# Patient Record
Sex: MALE | Race: WHITE | Employment: OTHER | URBAN - METROPOLITAN AREA
[De-identification: names, ages, dates, MRNs, and addresses within clinical notes are randomized per-mention and may not be internally consistent; named-entity substitution may affect disease eponyms.]

---

## 2017-05-16 PROBLEM — J18.9 CAP (COMMUNITY ACQUIRED PNEUMONIA): Status: ACTIVE | Noted: 2017-05-16

## 2017-10-26 ENCOUNTER — HOSPITAL ENCOUNTER (OUTPATIENT)
Dept: ENDOSCOPY | Age: 52
Discharge: HOME | End: 2017-10-26
Attending: INTERNAL MEDICINE | Admitting: INTERNAL MEDICINE

## 2017-11-30 ENCOUNTER — HOSPITAL ENCOUNTER (OUTPATIENT)
Dept: ENDOSCOPY | Age: 52
Discharge: HOME | End: 2017-11-30
Attending: INTERNAL MEDICINE | Admitting: INTERNAL MEDICINE

## 2017-11-30 VITALS
OXYGEN SATURATION: 98 % | DIASTOLIC BLOOD PRESSURE: 85 MMHG | WEIGHT: 182 LBS | BODY MASS INDEX: 24.65 KG/M2 | SYSTOLIC BLOOD PRESSURE: 151 MMHG | TEMPERATURE: 97.8 F | HEIGHT: 72 IN | RESPIRATION RATE: 18 BRPM | HEART RATE: 89 BPM

## 2017-11-30 LAB
GLUCOSE BLD-MCNC: 83 MG/DL (ref 70–99)
PERFORMED ON: NORMAL

## 2017-11-30 RX ORDER — FENOFIBRATE 160 MG/1
160 TABLET ORAL DAILY
COMMUNITY

## 2017-11-30 RX ORDER — ATORVASTATIN CALCIUM 10 MG/1
10 TABLET, FILM COATED ORAL DAILY
COMMUNITY

## 2017-11-30 ASSESSMENT — LIFESTYLE VARIABLES: SMOKING_STATUS: 0

## 2017-11-30 NOTE — PLAN OF CARE
ADMISSION PRE-PROCEDURE INTRA-PROCEDURE POST-PROCEDURE: RECOVERY/ DISCHARGE   ASSESSMENT &  EVALUATION CONSULTS [x] Verify patient identification, allergies, vital signs, NPO, IV, & SPO2  [x] Complete  the TOMMY SCORE  [x] Consent form to treat signed  [x] History and Physical [x] Reassess patient after pre- procedure medication given  [x] GI physician evaluates pt  [x] Verify patient's name, allergies [x] Continuous monotoring of vital  signs, SPO2, LOC  [x] Emotional status  [x] Patient comfort level [x] Total system admission assessment with appropriate intervention  [x] Pain evaluation and management  [x] Discharge assessment with appropriate intervention  [x] Compare with pre-procedure status  [x] Discharge by appropriate physician   DIAGNOSTIC / TESTS [x] Lab work ordered  [x] Obtain and attach lab work to patient's chart  [x] Report abnormals and F/U with physician [x] Assure needed test results are present [x] Diagnostic/therapeutic testing as indicated  [x] Obtained specimens sent to lab [x] Diagnostic testing as indicated  [x] Assess the gag reflex   MEDICATIONS [x] Conscious sedation medications  explained to patient  [x] Start IV per physician's order  and/or protocol  [x] Pre-procedure med. as ordered [x] Re-check IV access [x] Assist with administration of IV conscious sedation medication  [x] Start O2 per  nasal cannula, if needed   [x] IV fluids as indicated/ordered  [x] Administration of medications as ordered  [x] Medications as prescribed  [x] D/C O2 therapy as ordered   PROCEDURE/TREATMENT [x] Specific order by GI physician  [x] Specific procedure as scheduled  [x] Verify procedure as ordered [x] Time out/procedure verification checklist complete.  [x] EGD and related procedures  [x] Assist physician with the procedure [x] Treatment as indicated   NUTRITION / DIET [x] NPO after midnight, as ordered [x] Verify NPO status [x] IV fluids as support [x] Clear liquids and/or ice chips or special diet

## 2017-11-30 NOTE — OP NOTE
Procedure and Consults  EGD with biopsy for histology  Indications  H/o erosive esophagitis. Allergies  No Known Allergies  Consent  The anesthesia and procedure along with the risks, benefits and alternatives of each were explained by the physician  and nurse to the patient to their satisfaction and ample opportunities to ask questions was provided. We discussed  the risks of bleeding, perforation, anesthesia reaction as well as the alternatives, and missed lesions. Verbal and  written consent were obtained with the nurses present. Monitoring  The patient was monitored with continuous pulse oximetry, heart rate monitoring and intermittent blood pressure  monitoring throughout the procedure. Procedure Medications  The patient was administered the following medications during the procedure:  - Oxygen 2liters  - IV: Ringers Lactate 1000cc  - MAC  Details of Procedure  The upper endoscope was inserted atraumatically into the esophagus and advanced under direct vision to the second  portion of the duodenum. The scope was slowly withdrawn carefully inspecting the duodenal bulb, entire stomach in  both forward and retroflexed views and esophagus. GE junction showed irregular Z line. Biopsies were taken to evaluate for columnar epithelium. NO esophagitis  Post Op Diagnosis  - Irregular Z line. Recommendations  - Patient was instructed to call for biopsy results in 1 week and follow up appropriately. - If biopsies do not show columnar epithelium, can stop PPIs. Thank you and please let me know if there are any additional questions or concerns.

## 2017-11-30 NOTE — OP NOTE
Pt's IV d/c'd and discharge instructions reviewed and signed. Upon getting pt dressed, pt c/o pain in upper back between shoulder blades. Abdomen soft, non tender. No crepitus noted around throat or neck. CXR ordered.

## 2017-12-04 NOTE — ANESTHESIA POST-OP
Anesthesia Post-op Note    Patient: Neville Pelaez    Procedure(s) Performed: EGD    Anesthesia type: MAC    Patient location: PACU    Post-op pain: Adequate analgesia. Post-op assessment: no apparent anesthetic complications. Cardiovascular, respiratory function and airway patency are stable. Nausea and Vomiting are controlled. Adequate perioperative fluid hydration. Post-op vital signs: stable    BP (!) 151/85   Pulse 89   Temp 97.8 °F (36.6 °C)   Resp 18   Ht 6' (1.829 m)   Wt 182 lb (82.6 kg)   SpO2 98%   BMI 24.68 kg/m²      Level of consciousness: awake, alert  and oriented. Mental status returned to baseline    Complications: none    Patient has met criteria for discharge.        Alcides Lanier MD  12/4/2017

## 2018-10-19 ENCOUNTER — HOSPITAL ENCOUNTER (EMERGENCY)
Age: 53
Discharge: HOME | End: 2018-10-19
Attending: EMERGENCY MEDICINE
Payer: MEDICARE

## 2018-10-19 VITALS
BODY MASS INDEX: 24.95 KG/M2 | RESPIRATION RATE: 14 BRPM | HEART RATE: 86 BPM | SYSTOLIC BLOOD PRESSURE: 167 MMHG | OXYGEN SATURATION: 94 % | TEMPERATURE: 98.8 F | DIASTOLIC BLOOD PRESSURE: 78 MMHG | WEIGHT: 184 LBS

## 2018-10-19 DIAGNOSIS — H10.9 CONJUNCTIVITIS OF RIGHT EYE, UNSPECIFIED CONJUNCTIVITIS TYPE: Primary | ICD-10-CM

## 2018-10-19 PROCEDURE — 99282 EMERGENCY DEPT VISIT SF MDM: CPT

## 2018-10-19 RX ORDER — POLYMYXIN B SULFATE AND TRIMETHOPRIM 1; 10000 MG/ML; [USP'U]/ML
1 SOLUTION OPHTHALMIC EVERY 4 HOURS
Qty: 1 BOTTLE | Refills: 0 | Status: SHIPPED | OUTPATIENT
Start: 2018-10-19 | End: 2018-10-29

## 2018-10-19 NOTE — ED PROVIDER NOTES
EMERGENCY DEPARTMENT PHYSICIAN DOCUMENTATION      CHIEF COMPLAINT  Conjunctivitis (redness and crusitness of rt eye)      Patient information was obtained from caregiver / friend. History/Exam limitations: mental illness. HISTORY OF PRESENT ILLNESS  Constantine Cruz is a 47 y.o. male with complaint of Conjunctivitis (redness and crusitness of rt eye)  . Onset of symptoms several days ago  R eye. Eye is red, teary, discharge. Mildly irritated feeling. No trauma to eye, no significant pain. No fevers or chills. Associated with mild perhaps URI sx.   no sick contacts with similar sx. REVIEW OF SYSTEMS  A full 10 point Review of Systems was performed and is negative aside from pertinent positives mentioned in HPI    ALLERGIES:  No Known Allergies    PAST HISTORY  Past Medical History:   Diagnosis Date    [facility] [address]     Diabetes mellitus ([facility] [address]     prediabetic on metformin    Moderate mental retardation     Movement disorder     Pneumonia        History reviewed. No pertinent family history. No current facility-administered medications on file prior to encounter. Current Outpatient Prescriptions on File Prior to Encounter   Medication Sig Dispense Refill    fenofibrate 160 MG tablet Take 160 mg by mouth daily      atorvastatin (LIPITOR) 10 MG tablet Take 10 mg by mouth daily      pantoprazole (PROTONIX) 40 MG tablet Take 40 mg by mouth daily      fluocinolone (SYNALAR) 0.025 % ointment Apply topically 2 times daily Apply topically 2 times daily.       sennosides-docusate sodium (SENOKOT-S) 8.6-50 MG tablet Take 1 tablet by mouth daily      nystatin (MYCOSTATIN) 832580 UNIT/ML suspension Take 500,000 Units by mouth 4 times daily      lactulose (CHRONULAC) 10 GM/15ML solution Take 10 g by mouth daily      loratadine (CLARITIN) 10 MG tablet Take 10 mg by mouth daily      metFORMIN (GLUCOPHAGE) 500 MG tablet Take 500 mg by mouth 2 times daily (with meals)      Multiple Vitamins-Minerals (THERAPEUTIC MULTIVITAMIN-MINERALS) tablet Take 1 tablet by mouth daily      Dextromethorphan-guaiFENesin  MG/5ML SYRP Take 5 mLs by mouth every 4 hours as needed for Cough      pantoprazole (PROTONIX) 40 MG tablet Take 1 tablet by mouth 2 times daily 30 tablet 0       Social History   Substance Use Topics    Smoking status: Never Smoker    Smokeless tobacco: Never Used    Alcohol use No         EXAM:   Presentation Vital Signs: BP (!) 167/78   Pulse 86   Temp 98.8 °F (37.1 °C) (Oral)   Resp 14   Wt 83.5 kg (184 lb)   SpO2 94%   BMI 24.95 kg/m²     General: Well nourished, no acute distress  Head: No traumatic injury  ENT: MMM, no facial asymmetry, no nasal discharge   Eyes: EOM  R eye: REGULO, has injection, + discharge. No obvious corneal abrasions, cells, or hyphema. Neck: No tracheal deviation or stridor  Lungs: No respiratory distress  Skin: no pallor, erythema, lesions or other abnormalities on exposed skin of face and arms  Extremities: Normal ROM of bilateral upper extremities at shoulders, elbows, wrists; normal ROM of bilateral LE at hips and knees. Neurologic: Alert, oriented x 3. No focal deficits upon moving arms and legs  Psychiatric: Appropriate demeanor without agitation or internal stimulation      MEDICAL DECISION MAKING  Pt here with uncomplicated appearing conjunctivitis with discharge. Nontoxic appearance, dc with topical ophthalmic antibiotic. Given return instructions /signs for worsening symptoms. DISPOSITION  Home    IMPRESSION:  Conjunctivitis       This medical chart used with aid of transcription software. As such, there may be inadvertent errors in transcription of spellings and words despite physician's attempts to correct all possible errors.            Todd Benoit MD  10/19/18 [id number]

## 2019-04-15 ENCOUNTER — HOSPITAL ENCOUNTER (EMERGENCY)
Age: 54
Discharge: HOME | End: 2019-04-15
Attending: EMERGENCY MEDICINE
Payer: MEDICARE

## 2019-04-15 VITALS
OXYGEN SATURATION: 94 % | WEIGHT: 180 LBS | HEART RATE: 79 BPM | BODY MASS INDEX: 24.41 KG/M2 | RESPIRATION RATE: 18 BRPM | DIASTOLIC BLOOD PRESSURE: 98 MMHG | TEMPERATURE: 98.5 F | SYSTOLIC BLOOD PRESSURE: 169 MMHG

## 2019-04-15 DIAGNOSIS — H10.31 ACUTE BACTERIAL CONJUNCTIVITIS OF RIGHT EYE: Primary | ICD-10-CM

## 2019-04-15 PROCEDURE — 99282 EMERGENCY DEPT VISIT SF MDM: CPT

## 2019-04-15 RX ORDER — CIPROFLOXACIN HYDROCHLORIDE 3.5 MG/ML
1 SOLUTION/ DROPS TOPICAL 3 TIMES DAILY
Qty: 30 DROP | Refills: 0 | Status: SHIPPED | OUTPATIENT
Start: 2019-04-15 | End: 2019-04-25

## 2019-04-15 NOTE — ED PROVIDER NOTES
PANTOPRAZOLE (PROTONIX) 40 MG TABLET    Take 1 tablet by mouth 2 times daily    PANTOPRAZOLE (PROTONIX) 40 MG TABLET    Take 40 mg by mouth daily    SENNOSIDES-DOCUSATE SODIUM (SENOKOT-S) 8.6-50 MG TABLET    Take 1 tablet by mouth daily       Allergies     He has No Known Allergies. Physical Exam     INITIAL VITALS: BP: (!) 169/98, Temp: 98.5 °F (36.9 °C), Pulse: 79, Resp: 18, SpO2: 94 %   Constitutional:  Well developed, well nourished, no acute distress, non-toxic appearance   Eyes:  Pupils equal reactive to light, extraocular muscles are intact, there is conjunctival injection of the right eye. There is not photophobia of the affected eye. Eyelid of the affected eye appears normal.  Chemosis is not present. Discharge is  present. HENT:  Atraumatic, no swelling  Integument:  Well hydrated, no rash, no bruising  Neurologic:  No focal deficits noted     RECENT VITALS:  BP: (!) 169/98, Temp: 98.5 °F (36.9 °C), Pulse: 79, Resp: 18, SpO2: 94 %     The patient was given the following medications:  Orders Placed This Encounter   Medications    ciprofloxacin (CILOXAN) 0.3 % ophthalmic solution     Sig: Place 1 drop into both eyes 3 times daily for 10 days     Dispense:  30 drop     Refill:  0       CONSULTS:  None    MEDICAL DECISION MAKING / ASSESSMENT / Vale Costa is a 47 y.o. male presenting with symptoms of discharge from his right eye and exam findings are consistent with conjunctivitis. There is erythema and purulent discharge present, and given these findings, the patient will be placed on topical antibiotic and discharged home to followup with his primary care physician or ophthalmology. Instructions pertaining to conjunctivitis were given to the patient as well. Clinical Impression     1.  Acute bacterial conjunctivitis of right eye        Disposition     PATIENT REFERRED TO:  Ken Hernandez [id number] [phone]    In 5 days  if not improving      DISCHARGE MEDICATIONS:  New Prescriptions    CIPROFLOXACIN (CILOXAN) 0.3 % OPHTHALMIC SOLUTION    Place 1 drop into both eyes 3 times daily for 10 days       DISPOSITION Decision To Discharge 04/15/2019 05:36:29 PM       Austen Noriega MD  04/15/19 2040

## 2020-06-19 ENCOUNTER — APPOINTMENT (OUTPATIENT)
Dept: CT IMAGING | Age: 55
End: 2020-06-19
Payer: MEDICARE

## 2020-06-19 ENCOUNTER — APPOINTMENT (OUTPATIENT)
Dept: GENERAL RADIOLOGY | Age: 55
End: 2020-06-19
Payer: MEDICARE

## 2020-06-19 ENCOUNTER — HOSPITAL ENCOUNTER (EMERGENCY)
Age: 55
Discharge: HOME | End: 2020-06-19
Attending: EMERGENCY MEDICINE
Payer: MEDICARE

## 2020-06-19 VITALS
HEART RATE: 82 BPM | SYSTOLIC BLOOD PRESSURE: 174 MMHG | DIASTOLIC BLOOD PRESSURE: 96 MMHG | TEMPERATURE: 97.8 F | RESPIRATION RATE: 17 BRPM | OXYGEN SATURATION: 95 %

## 2020-06-19 LAB
ALBUMIN SERPL-MCNC: 4.2 G/DL (ref 3.4–5)
ALP BLD-CCNC: 62 U/L (ref 40–129)
ALT SERPL-CCNC: 9 U/L (ref 10–40)
ANION GAP SERPL CALCULATED.3IONS-SCNC: 9 MMOL/L (ref 3–16)
AST SERPL-CCNC: 23 U/L (ref 15–37)
BASOPHILS ABSOLUTE: 0 K/UL (ref 0–0.2)
BASOPHILS RELATIVE PERCENT: 0.3 %
BILIRUB SERPL-MCNC: <0.2 MG/DL (ref 0–1)
BILIRUBIN DIRECT: <0.2 MG/DL (ref 0–0.3)
BILIRUBIN URINE: NEGATIVE
BILIRUBIN, INDIRECT: ABNORMAL MG/DL (ref 0–1)
BLOOD, URINE: NEGATIVE
BUN BLDV-MCNC: 13 MG/DL (ref 7–20)
CALCIUM SERPL-MCNC: 9.5 MG/DL (ref 8.3–10.6)
CHLORIDE BLD-SCNC: 101 MMOL/L (ref 99–110)
CLARITY: CLEAR
CO2: 28 MMOL/L (ref 21–32)
COLOR: YELLOW
CREAT SERPL-MCNC: 0.8 MG/DL (ref 0.9–1.3)
EKG ATRIAL RATE: 93 BPM
EKG DIAGNOSIS: NORMAL
EKG P AXIS: 44 DEGREES
EKG P-R INTERVAL: 182 MS
EKG Q-T INTERVAL: 370 MS
EKG QRS DURATION: 112 MS
EKG QTC CALCULATION (BAZETT): 460 MS
EKG R AXIS: -22 DEGREES
EKG T AXIS: 36 DEGREES
EKG VENTRICULAR RATE: 93 BPM
EOSINOPHILS ABSOLUTE: 0 K/UL (ref 0–0.6)
EOSINOPHILS RELATIVE PERCENT: 0.4 %
GFR AFRICAN AMERICAN: >60
GFR NON-AFRICAN AMERICAN: >60
GLUCOSE BLD-MCNC: 97 MG/DL (ref 70–99)
GLUCOSE URINE: NEGATIVE MG/DL
HCT VFR BLD CALC: 35.3 % (ref 40.5–52.5)
HEMOGLOBIN: 11.3 G/DL (ref 13.5–17.5)
KETONES, URINE: NEGATIVE MG/DL
LACTIC ACID: 2.5 MMOL/L (ref 0.4–2)
LEUKOCYTE ESTERASE, URINE: NEGATIVE
LIPASE: 17 U/L (ref 13–60)
LYMPHOCYTES ABSOLUTE: 1.6 K/UL (ref 1–5.1)
LYMPHOCYTES RELATIVE PERCENT: 18.3 %
MCH RBC QN AUTO: 26.7 PG (ref 26–34)
MCHC RBC AUTO-ENTMCNC: 31.9 G/DL (ref 31–36)
MCV RBC AUTO: 83.8 FL (ref 80–100)
MICROSCOPIC EXAMINATION: NORMAL
MONOCYTES ABSOLUTE: 0.4 K/UL (ref 0–1.3)
MONOCYTES RELATIVE PERCENT: 4.6 %
NEUTROPHILS ABSOLUTE: 6.7 K/UL (ref 1.7–7.7)
NEUTROPHILS RELATIVE PERCENT: 76.4 %
NITRITE, URINE: NEGATIVE
PDW BLD-RTO: 18.5 % (ref 12.4–15.4)
PH UA: 7 (ref 5–8)
PLATELET # BLD: 176 K/UL (ref 135–450)
PMV BLD AUTO: 9.3 FL (ref 5–10.5)
POTASSIUM SERPL-SCNC: 3.8 MMOL/L (ref 3.5–5.1)
PROTEIN UA: NEGATIVE MG/DL
RBC # BLD: 4.21 M/UL (ref 4.2–5.9)
SODIUM BLD-SCNC: 138 MMOL/L (ref 136–145)
SPECIFIC GRAVITY UA: 1.01 (ref 1–1.03)
TOTAL PROTEIN: 7.3 G/DL (ref 6.4–8.2)
TROPONIN: <0.01 NG/ML
URINE TYPE: NORMAL
UROBILINOGEN, URINE: 1 E.U./DL
WBC # BLD: 8.8 K/UL (ref 4–11)

## 2020-06-19 PROCEDURE — 99285 EMERGENCY DEPT VISIT HI MDM: CPT

## 2020-06-19 PROCEDURE — 81003 URINALYSIS AUTO W/O SCOPE: CPT

## 2020-06-19 PROCEDURE — 2580000003 HC RX 258: Performed by: EMERGENCY MEDICINE

## 2020-06-19 PROCEDURE — 80048 BASIC METABOLIC PNL TOTAL CA: CPT

## 2020-06-19 PROCEDURE — 85025 COMPLETE CBC W/AUTO DIFF WBC: CPT

## 2020-06-19 PROCEDURE — 83690 ASSAY OF LIPASE: CPT

## 2020-06-19 PROCEDURE — 83605 ASSAY OF LACTIC ACID: CPT

## 2020-06-19 PROCEDURE — 93005 ELECTROCARDIOGRAM TRACING: CPT | Performed by: EMERGENCY MEDICINE

## 2020-06-19 PROCEDURE — 70450 CT HEAD/BRAIN W/O DYE: CPT

## 2020-06-19 PROCEDURE — 80076 HEPATIC FUNCTION PANEL: CPT

## 2020-06-19 PROCEDURE — 74022 RADEX COMPL AQT ABD SERIES: CPT

## 2020-06-19 PROCEDURE — 84484 ASSAY OF TROPONIN QUANT: CPT

## 2020-06-19 RX ORDER — MULTIVIT-MIN/IRON/FOLIC ACID/K 18-600-40
CAPSULE ORAL
COMMUNITY

## 2020-06-19 RX ORDER — AMOXICILLIN AND CLAVULANATE POTASSIUM 875; 125 MG/1; MG/1
1 TABLET, FILM COATED ORAL 2 TIMES DAILY
Qty: 28 TABLET | Refills: 0 | Status: SHIPPED | OUTPATIENT
Start: 2020-06-19 | End: 2020-07-03

## 2020-06-19 RX ORDER — SODIUM CHLORIDE, SODIUM LACTATE, POTASSIUM CHLORIDE, AND CALCIUM CHLORIDE .6; .31; .03; .02 G/100ML; G/100ML; G/100ML; G/100ML
1000 INJECTION, SOLUTION INTRAVENOUS ONCE
Status: COMPLETED | OUTPATIENT
Start: 2020-06-19 | End: 2020-06-19

## 2020-06-19 RX ORDER — PANTOPRAZOLE SODIUM 40 MG/1
40 TABLET, DELAYED RELEASE ORAL DAILY
COMMUNITY

## 2020-06-19 RX ORDER — LOSARTAN POTASSIUM 50 MG/1
50 TABLET ORAL DAILY
COMMUNITY

## 2020-06-19 RX ORDER — KETOCONAZOLE 20 MG/ML
SHAMPOO TOPICAL WEEKLY
COMMUNITY

## 2020-06-19 RX ORDER — POLYETHYLENE GLYCOL 3350 17 G/17G
17 POWDER, FOR SOLUTION ORAL DAILY
COMMUNITY

## 2020-06-19 RX ADMIN — SODIUM CHLORIDE, POTASSIUM CHLORIDE, SODIUM LACTATE AND CALCIUM CHLORIDE 1000 ML: 600; 310; 30; 20 INJECTION, SOLUTION INTRAVENOUS at 13:57

## 2020-06-19 ASSESSMENT — PAIN DESCRIPTION - LOCATION: LOCATION: ABDOMEN

## 2020-06-19 ASSESSMENT — PAIN DESCRIPTION - PAIN TYPE: TYPE: ACUTE PAIN

## 2020-06-19 ASSESSMENT — PAIN SCALES - GENERAL: PAINLEVEL_OUTOF10: 7

## 2020-06-19 ASSESSMENT — PAIN DESCRIPTION - ORIENTATION: ORIENTATION: MID

## 2020-06-19 NOTE — ED TRIAGE NOTES
Patient brought to ED by squad. Patient resides in group home. Patient complains of abdominal pain 7/10. PIV placed using aseptic technique per hospital policy. Blood collected and sent to lab.   EKG completed

## 2020-06-19 NOTE — ED NOTES
Patient discharged to group home via carefiver. Written discharge instructions reviewed with understanding. Copy of AVS sent home with patient. PIV removed from right ac. No redness or swelling at insert site. Patient discharged from ED by stretcher.      Leighann Ortez RN  06/19/20 7558

## 2020-06-19 NOTE — ED PROVIDER NOTES
opacification left frontal sinus superior ethmoid air cells are opacified and there is partial opacification and extensive mucosal thickening involving    the right maxillary sinus. No subdural or epidural hematoma appreciated      XR Acute Abd Series Chest 1 VW   Final Result   Impression:       1. No free intraperitoneal air. Nonobstructive bowel gas pattern. 2. No acute cardiopulmonary abnormality.            LABS:   Results for orders placed or performed during the hospital encounter of 06/19/20   CBC auto differential   Result Value Ref Range    WBC 8.8 4.0 - 11.0 K/uL    RBC 4.21 4.20 - 5.90 M/uL    Hemoglobin 11.3 (L) 13.5 - 17.5 g/dL    Hematocrit 35.3 (L) 40.5 - 52.5 %    MCV 83.8 80.0 - 100.0 fL    MCH 26.7 26.0 - 34.0 pg    MCHC 31.9 31.0 - 36.0 g/dL    RDW 18.5 (H) 12.4 - 15.4 %    Platelets 885 390 - 440 K/uL    MPV 9.3 5.0 - 10.5 fL    Neutrophils % 76.4 %    Lymphocytes % 18.3 %    Monocytes % 4.6 %    Eosinophils % 0.4 %    Basophils % 0.3 %    Neutrophils Absolute 6.7 1.7 - 7.7 K/uL    Lymphocytes Absolute 1.6 1.0 - 5.1 K/uL    Monocytes Absolute 0.4 0.0 - 1.3 K/uL    Eosinophils Absolute 0.0 0.0 - 0.6 K/uL    Basophils Absolute 0.0 0.0 - 0.2 K/uL   Basic Metabolic Panel (EP - 1)   Result Value Ref Range    Sodium 138 136 - 145 mmol/L    Potassium 3.8 3.5 - 5.1 mmol/L    Chloride 101 99 - 110 mmol/L    CO2 28 21 - 32 mmol/L    Anion Gap 9 3 - 16    Glucose 97 70 - 99 mg/dL    BUN 13 7 - 20 mg/dL    CREATININE 0.8 (L) 0.9 - 1.3 mg/dL    GFR Non-African American >60 >60    GFR African American >60 >60    Calcium 9.5 8.3 - 10.6 mg/dL   Hepatic function panel (LFTs)   Result Value Ref Range    Total Protein 7.3 6.4 - 8.2 g/dL    Alb 4.2 3.4 - 5.0 g/dL    Alkaline Phosphatase 62 40 - 129 U/L    ALT 9 (L) 10 - 40 U/L    AST 23 15 - 37 U/L    Total Bilirubin <0.2 0.0 - 1.0 mg/dL    Bilirubin, Direct <0.2 0.0 - 0.3 mg/dL    Bilirubin, Indirect see below 0.0 - 1.0 mg/dL   Lipase   Result Value Ref

## 2020-06-19 NOTE — ED NOTES
Bed: B18-18  Expected date:   Expected time:   Means of arrival:   Comments:  monique Barros RN  06/19/20 0621

## 2021-04-29 ENCOUNTER — APPOINTMENT (OUTPATIENT)
Dept: GENERAL RADIOLOGY | Age: 56
End: 2021-04-29
Payer: MEDICARE

## 2021-04-29 ENCOUNTER — APPOINTMENT (OUTPATIENT)
Dept: CT IMAGING | Age: 56
End: 2021-04-29
Payer: MEDICARE

## 2021-04-29 ENCOUNTER — HOSPITAL ENCOUNTER (EMERGENCY)
Age: 56
Discharge: HOME | End: 2021-04-29
Attending: EMERGENCY MEDICINE
Payer: MEDICARE

## 2021-04-29 VITALS
OXYGEN SATURATION: 94 % | RESPIRATION RATE: 18 BRPM | DIASTOLIC BLOOD PRESSURE: 102 MMHG | TEMPERATURE: 98.4 F | SYSTOLIC BLOOD PRESSURE: 180 MMHG | HEART RATE: 90 BPM

## 2021-04-29 DIAGNOSIS — S01.01XA LACERATION OF SCALP, INITIAL ENCOUNTER: ICD-10-CM
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
## 2021-04-29 PROCEDURE — 12002 RPR S/N/AX/GEN/TRNK2.6-7.5CM: CPT

## 2021-04-29 PROCEDURE — 2500000003 HC RX 250 WO HCPCS: Performed by: PHYSICIAN ASSISTANT

## 2021-04-29 PROCEDURE — 72125 CT NECK SPINE W/O DYE: CPT

## 2021-04-29 PROCEDURE — 74220 X-RAY XM ESOPHAGUS 1CNTRST: CPT

## 2021-04-29 PROCEDURE — 72070 X-RAY EXAM THORAC SPINE 2VWS: CPT

## 2021-04-29 PROCEDURE — 72100 X-RAY EXAM L-S SPINE 2/3 VWS: CPT

## 2021-04-29 PROCEDURE — 99284 EMERGENCY DEPT VISIT MOD MDM: CPT

## 2021-04-29 PROCEDURE — 70450 CT HEAD/BRAIN W/O DYE: CPT

## 2021-04-29 RX ORDER — LIDOCAINE HYDROCHLORIDE AND EPINEPHRINE 10; 10 MG/ML; UG/ML
20 INJECTION, SOLUTION INFILTRATION; PERINEURAL ONCE
Status: COMPLETED | OUTPATIENT
Start: 2021-04-29 | End: 2021-04-29

## 2021-04-29 RX ADMIN — LIDOCAINE HYDROCHLORIDE,EPINEPHRINE BITARTRATE 20 ML: 10; .01 INJECTION, SOLUTION INFILTRATION; PERINEURAL at 12:02

## 2021-04-29 ASSESSMENT — PAIN SCALES - GENERAL: PAINLEVEL_OUTOF10: 5

## 2021-04-29 NOTE — ED NOTES
Pt discharged back to group home in stable condition. [place] ambulance at bedside to take pt back to residence.       Lyndsay Wylie RN  04/29/21 5446

## 2021-04-29 NOTE — ED PROVIDER NOTES
ED Attending Attestation Note     Date of evaluation: 4/29/2021    This patient was seen by the advance practice provider. I have seen and examined the patient, agree with the workup, evaluation, management and diagnosis. The care plan has been discussed. My assessment reveals patient who presents status post fall with laceration to posterior scalp. Has a history of cerebral palsy. Present for laceration repair.      Lino Garrett MD  04/29/21 4615

## 2021-04-29 NOTE — ED PROVIDER NOTES
242982 UNIT/ML SUSPENSION    Take 500,000 Units by mouth 4 times daily    PANTOPRAZOLE (PROTONIX) 40 MG TABLET    Take 1 tablet by mouth 2 times daily    PANTOPRAZOLE (PROTONIX) 40 MG TABLET    Take 40 mg by mouth daily    POLYETHYLENE GLYCOL (GLYCOLAX) 17 G PACKET    Take 17 g by mouth daily       Allergies     He has No Known Allergies. Physical Exam     INITIAL VITALS: BP: (!) 182/115, Temp: 98.4 °F (36.9 °C), Pulse: 96, Resp: 18, SpO2: 92 %  Physical Exam  Constitutional:       Appearance: Normal appearance. HENT:      Head: Normocephalic. Comments: 3 cm horizontal laceration to posterior aspect of scalp with mild to moderate active bleeding. Nose: Nose normal.      Mouth/Throat:      Mouth: Mucous membranes are moist.   Eyes:      Extraocular Movements: Extraocular movements intact. Conjunctiva/sclera: Conjunctivae normal.   Neck:      Comments: C-collar in place. Diffuse tenderness to midline C-spine. Cardiovascular:      Rate and Rhythm: Normal rate. Pulmonary:      Effort: Pulmonary effort is normal. No respiratory distress. Chest:      Chest wall: No tenderness. Abdominal:      General: There is no distension. Palpations: Abdomen is soft. Tenderness: There is no abdominal tenderness. Musculoskeletal:      Comments: Diffuse tenderness to midline thoracic and lumbar spine. Neurological:      General: No focal deficit present. Mental Status: He is alert. Sensory: No sensory deficit. Comments: Able to raise each leg off bed. MR at baseline. Mild contractures to and hands at baseline. Diagnostic Results     RADIOLOGY:  FL ESOPHAGRAM   Final Result      Esophageal dysmotility and reflux without evidence for esophageal stricture      XR LUMBAR SPINE (2-3 VIEWS)   Final Result   1. Mild lower lumbar degenerative disc disease and facet arthropathy without acute osseous abnormality. XR THORACIC SPINE (2 VIEWS)   Final Result   1.  Mild thoracic spine degenerative disc disease otherwise no acute abnormality. CT Cervical Spine WO Contrast   Final Result      1. No acute fracture. 2. Degenerative changes as above with multilevel severe canal stenosis. 3. Dilated fluid-filled esophagus up to the level of the thoracic inlet which could be related to reflux, dysmotility, or distal stricture. Consider endoscopy or esophagram to exclude esophageal mass. CT Head WO Contrast   Final Result      1. No evidence of recent intracranial hemorrhage. 2. Mucosal periosteal thickening of the right maxillary sinus improved compared to 6/19/2020. LABS:   No results found for this visit on 04/29/21. RECENT VITALS:  BP: (!) 180/102, Temp: 98.4 °F (36.9 °C), Pulse: 90, Resp: 18, SpO2: 94 %     Procedures     Lac Repair    Date/Time: 4/29/2021 12:01 PM  Performed by: Alma Carlisle PA-C  Authorized by: Wilson Buckley MD     Consent:     Consent obtained:  Verbal    Consent given by:  Patient    Risks discussed:  Infection and pain    Alternatives discussed:  No treatment, delayed treatment and observation  Anesthesia (see MAR for exact dosages): Anesthesia method:  Local infiltration    Local anesthetic:  Lidocaine 1% WITH epi  Laceration details:     Location:  Scalp    Scalp location:  Occipital    Length (cm):  3  Repair type:     Repair type:  Simple  Pre-procedure details:     Preparation:  Patient was prepped and draped in usual sterile fashion and imaging obtained to evaluate for foreign bodies  Treatment:     Area cleansed with:  Saline    Amount of cleaning:  Standard    Irrigation solution:  Sterile saline    Irrigation method:  Syringe  Skin repair:     Repair method:  Staples    Number of staples:  8  Approximation:     Approximation:  Close  Post-procedure details:     Dressing:  Open (no dressing)    Patient tolerance of procedure:   Tolerated well, no immediate complications          ED Course     Nursing Notes, Past Medical Hx,Past Surgical Hx, Social Hx, Allergies, and Family Hx were reviewed. The patient was given the following medications:  Orders Placed This Encounter   Medications    lidocaine-EPINEPHrine 1 %-1:196341 injection 20 mL       CONSULTS:  None    MEDICAL DECISION MAKING / ASSESSMENT / Nestorpratima Denny is a 48 y.o. male presenting with posterior scalp laceration after mechanical fall in shower. Tetanus up-to-date and not due until 2026, so not updated today. While overall performed and backboard was removed. Patient does have diffuse midline spinal tenderness from C-spine all the way to L-spine. Patient has good range of motion of extremities with no complaints of pain to these areas. Hard c-collar was replaced with soft collar. CT head and C-spine ordered along with x-ray thoracic and lumbar spine show and showed:  XR LUMBAR SPINE (2-3 VIEWS)   Final Result   1. Mild lower lumbar degenerative disc disease and facet arthropathy without acute osseous abnormality. XR THORACIC SPINE (2 VIEWS)   Final Result   1. Mild thoracic spine degenerative disc disease otherwise no acute abnormality. CT Cervical Spine WO Contrast   Final Result      1. No acute fracture. 2. Degenerative changes as above with multilevel severe canal stenosis. 3. Dilated fluid-filled esophagus up to the level of the thoracic inlet which could be related to reflux, dysmotility, or distal stricture. Consider endoscopy or esophagram to exclude esophageal mass. CT Head WO Contrast   Final Result      1. No evidence of recent intracranial hemorrhage. 2. Mucosal periosteal thickening of the right maxillary sinus improved compared to 6/19/2020. Laceration was repaired primarily as described in detail above. Patient tolerated procedure without difficulty.   Given that patient has abnormal range of motion regarding fluid-filled esophagus on CT C-spine, esophagram was ordered and showed:  FL ESOPHAGRAM   Final Result      Esophageal dysmotility and reflux without evidence for esophageal stricture     Patient stated he felt comfortable going home. Group home was contacted and patient will be transferred back. Discussed that staples should be removed in 7 days. Discussed appropriate wound care and placed this in discharge paperwork. Discussed signs and symptoms of infection that would prompt return to the emergency department and patient was transferred back to [facility]. This patient was also evaluated by the attending physician. All care plans were discussed and agreed upon. Clinical Impression     1. Fall, initial encounter    2.  Laceration of scalp, initial encounter        Disposition     PATIENT REFERRED TO:  Ken Michelle [facility] [id number]  [phone]    Call   As needed      DISCHARGE MEDICATIONS:  New Prescriptions    No medications on file       DISPOSITION Decision To Discharge 04/29/2021 02:23:36 PM        Armando Do PA-C  04/29/21 1527

## 2021-04-29 NOTE — ED TRIAGE NOTES
Pt arrived to ED after falling in shower. Present with small head laceration along with neck and upper back pain. C-collar in place. Log rolled to get off back board.